# Patient Record
Sex: MALE
[De-identification: names, ages, dates, MRNs, and addresses within clinical notes are randomized per-mention and may not be internally consistent; named-entity substitution may affect disease eponyms.]

---

## 2019-08-06 ENCOUNTER — APPOINTMENT (OUTPATIENT)
Dept: OTHER | Facility: CLINIC | Age: 64
End: 2019-08-06
Payer: COMMERCIAL

## 2019-08-06 VITALS
OXYGEN SATURATION: 94 % | WEIGHT: 228 LBS | SYSTOLIC BLOOD PRESSURE: 133 MMHG | HEART RATE: 86 BPM | RESPIRATION RATE: 16 BRPM | DIASTOLIC BLOOD PRESSURE: 74 MMHG | HEIGHT: 72 IN | BODY MASS INDEX: 30.88 KG/M2

## 2019-08-06 DIAGNOSIS — Z80.1 FAMILY HISTORY OF MALIGNANT NEOPLASM OF TRACHEA, BRONCHUS AND LUNG: ICD-10-CM

## 2019-08-06 DIAGNOSIS — Z82.5 FAMILY HISTORY OF ASTHMA AND OTHER CHRONIC LOWER RESPIRATORY DISEASES: ICD-10-CM

## 2019-08-06 DIAGNOSIS — Z87.81 PERSONAL HISTORY OF (HEALED) TRAUMATIC FRACTURE: ICD-10-CM

## 2019-08-06 DIAGNOSIS — Z04.9 ENCOUNTER FOR EXAMINATION AND OBSERVATION FOR UNSPECIFIED REASON: ICD-10-CM

## 2019-08-06 DIAGNOSIS — Z83.518 FAMILY HISTORY OF OTHER SPECIFIED EYE DISORDER: ICD-10-CM

## 2019-08-06 DIAGNOSIS — Z83.49 FAMILY HISTORY OF OTHER ENDOCRINE, NUTRITIONAL AND METABOLIC DISEASES: ICD-10-CM

## 2019-08-06 PROCEDURE — 99396 PREV VISIT EST AGE 40-64: CPT | Mod: 25

## 2019-08-06 PROCEDURE — 94010 BREATHING CAPACITY TEST: CPT

## 2019-08-06 RX ORDER — LEVOTHYROXINE SODIUM 137 UG/1
TABLET ORAL
Refills: 0 | Status: ACTIVE | COMMUNITY

## 2019-08-07 LAB
ALBUMIN SERPL ELPH-MCNC: 4.7 G/DL
ALP BLD-CCNC: 76 U/L
ALT SERPL-CCNC: 14 U/L
ANION GAP SERPL CALC-SCNC: 13 MMOL/L
APPEARANCE: CLEAR
AST SERPL-CCNC: 20 U/L
BACTERIA: NEGATIVE
BASOPHILS # BLD AUTO: 0.07 K/UL
BASOPHILS NFR BLD AUTO: 1.3 %
BILIRUB SERPL-MCNC: 0.8 MG/DL
BILIRUBIN URINE: NEGATIVE
BLOOD URINE: NEGATIVE
BUN SERPL-MCNC: 16 MG/DL
CALCIUM SERPL-MCNC: 10.1 MG/DL
CHLORIDE SERPL-SCNC: 104 MMOL/L
CHOLEST SERPL-MCNC: 228 MG/DL
CHOLEST/HDLC SERPL: 4.1 RATIO
CO2 SERPL-SCNC: 24 MMOL/L
COLOR: YELLOW
CREAT SERPL-MCNC: 1.16 MG/DL
EOSINOPHIL # BLD AUTO: 0.19 K/UL
EOSINOPHIL NFR BLD AUTO: 3.5 %
GLUCOSE QUALITATIVE U: NEGATIVE
GLUCOSE SERPL-MCNC: 103 MG/DL
HCT VFR BLD CALC: 45.7 %
HDLC SERPL-MCNC: 56 MG/DL
HGB BLD-MCNC: 14.7 G/DL
HYALINE CASTS: 0 /LPF
IMM GRANULOCYTES NFR BLD AUTO: 0.7 %
KETONES URINE: NEGATIVE
LDLC SERPL CALC-MCNC: 155 MG/DL
LEUKOCYTE ESTERASE URINE: NEGATIVE
LYMPHOCYTES # BLD AUTO: 1.6 K/UL
LYMPHOCYTES NFR BLD AUTO: 29.1 %
MAN DIFF?: NORMAL
MCHC RBC-ENTMCNC: 29.9 PG
MCHC RBC-ENTMCNC: 32.2 GM/DL
MCV RBC AUTO: 93.1 FL
MICROSCOPIC-UA: NORMAL
MONOCYTES # BLD AUTO: 0.57 K/UL
MONOCYTES NFR BLD AUTO: 10.4 %
NEUTROPHILS # BLD AUTO: 3.02 K/UL
NEUTROPHILS NFR BLD AUTO: 55 %
NITRITE URINE: NEGATIVE
PH URINE: 5.5
PLATELET # BLD AUTO: 311 K/UL
POTASSIUM SERPL-SCNC: 5.1 MMOL/L
PROT SERPL-MCNC: 7.1 G/DL
PROTEIN URINE: NEGATIVE
RBC # BLD: 4.91 M/UL
RBC # FLD: 12.9 %
RED BLOOD CELLS URINE: 0 /HPF
SODIUM SERPL-SCNC: 141 MMOL/L
SPECIFIC GRAVITY URINE: 1.01
SQUAMOUS EPITHELIAL CELLS: 0 /HPF
TRIGL SERPL-MCNC: 84 MG/DL
UROBILINOGEN URINE: NORMAL
WBC # FLD AUTO: 5.49 K/UL
WHITE BLOOD CELLS URINE: 0 /HPF

## 2019-08-07 NOTE — DISCUSSION/SUMMARY
[Patient seen for WTC Monitoring ___] : Patient was seen for WTC monitoring [unfilled] [Please See Note in Chart and Documentation in Trial DB] : Please see note in chart and documentation in Trial DB. [FreeTextEntry3] : [facility] Monitoring Evaluation\par \par  ID#: [id number] 	               \par  Visit:6	                        \par  Date: 8/6/19\par \par HPI: 64 yr old white male presents to [facility] for 6th annual monitoring visit. He is treated for hypothyroidism. He c/o snoring and has witnessed apneas. He has chronic nasal stuffiness since Feb of 2002 and his MD has treated him with Vaseline applications which he states works. He has tinnitus and some hearing loss. He gets chest tightness when he is anxious but has seen his MD about it. He had a Moh's procedure a while back and had skin ca. \par \par PCP: Dr. Antonio Madrigal ([place])\par \par [facility] [employer] Exposure Hx:Making sure services were working /electric  \par Occupational Hx:  \par \par Preventive Screening: \par Colonoscopy- 2014                          \par CXR-will have today\par Prostate exam-8 yrs ago\par \par PMH:See Allscripts and Trial DB\par PSH: See Allscripts and Trial DB \par Family History: See Allscripts and Trial DB \par Allergies: See Allscripts and Trial DB \par Meds: See Trial DB and Allscripts \par Social  Hx: See Allscripts and Trial DB\par Smoking Status: See Allscripts and Trial DB \par Review of Systems-IAMQ reviewed with patient\par \par PHYSICAL EXAM: See Trial DB.  \par \par Spirometry: Reviewed. Normal \par \par Assessment:\par Hypothyroidism\par Chest tightness with anxiety\par Chronic sinusitis\par Tinnitus/hearing loss\par Snoring/witnessed apneas\par Osteoarthritis\par Hx skin ca\par \par \par Plan: \par CBC, CMP, lipids, UA, spirometry, CXR ordered\par Pt refusing to see pulmonologist for snoring but states he will discuss with PCP\par Pt refusing ENT appt for sinuses\par Pt instructed to forward path report for skin ca to clinic\par Will cert for sinusitis\par F/U at [facility] post certification\par \par \par \par   \par \par \par \par \par \par \par \par \par

## 2019-09-17 ENCOUNTER — APPOINTMENT (OUTPATIENT)
Dept: OTHER | Facility: CLINIC | Age: 64
End: 2019-09-17
Payer: COMMERCIAL

## 2019-09-17 VITALS
OXYGEN SATURATION: 98 % | SYSTOLIC BLOOD PRESSURE: 145 MMHG | RESPIRATION RATE: 16 BRPM | BODY MASS INDEX: 30.07 KG/M2 | HEART RATE: 89 BPM | DIASTOLIC BLOOD PRESSURE: 84 MMHG | HEIGHT: 72 IN | WEIGHT: 222 LBS

## 2019-09-17 DIAGNOSIS — J32.9 CHRONIC SINUSITIS, UNSPECIFIED: ICD-10-CM

## 2019-09-17 PROCEDURE — 99203 OFFICE O/P NEW LOW 30 MIN: CPT

## 2019-09-17 RX ORDER — FLUTICASONE PROPIONATE 50 UG/1
50 SPRAY, METERED NASAL DAILY
Qty: 1 | Refills: 6 | Status: ACTIVE | COMMUNITY
Start: 2019-09-17 | End: 1900-01-01

## 2019-09-17 RX ORDER — LORATADINE 5 MG/5 ML
10-240 SOLUTION, ORAL ORAL
Qty: 1 | Refills: 6 | Status: ACTIVE | COMMUNITY
Start: 2019-09-17 | End: 1900-01-01

## 2019-09-17 NOTE — HISTORY OF PRESENT ILLNESS
[FreeTextEntry1] : [facility] Monitoring Post Certification Evaluation\par \par Date: 9/17/19\par \par HPI: 64 yr old white male presents to [facility] for post certification visit. He is certified for BCC and chronic sinusitis. He c/o chronic nasal congestion.\par \par PMH:See Allscripts \par PSH: See Allscripts \par Family History: See Allscripts \par Allergies: See Allscripts \par Meds: See Allscripts \par Social  Hx: See Allscripts \par Smoking Status: See Allscripts \par Review of Systems-chronic nasal congestion\par \par PHYSICAL EXAM: \par HEENT:ERMELINDA, normal TM's, no rhinitis, sinus congestion \par Neck:supple; no thyromegaly or masses\par Chest:clear, no wheezing, rales or rhonchi\par Heart:no murmurs, rubs or gallops, regular rate\par Lungs: clear bilaterally, no wheezing, rales or rhonchi\par Abdomen:soft, non distended, soft, no tenderness, no organomegaly\par Extremities:no edema, +pulses\par Skin:supple and intact\par Neuro:intact\par Vital signs: 6'0" 222 lbs BMI 30.11; 145/84 89 16 98% O2 sat\par \par  Assessment:\par Chronic sinusitis\par Hx BCC-2 sites\par \par Plan: \par Flonase and Claritin ordered\par Pt to meet with  today to discuss how program works\par F/U at [facility] in 1 year\par \par   \par \par \par \par \par \par \par \par

## 2021-04-13 ENCOUNTER — APPOINTMENT (OUTPATIENT)
Dept: OTHER | Facility: CLINIC | Age: 66
End: 2021-04-13

## 2021-10-12 ENCOUNTER — NON-APPOINTMENT (OUTPATIENT)
Age: 66
End: 2021-10-12